# Patient Record
Sex: MALE | Race: WHITE | NOT HISPANIC OR LATINO
[De-identification: names, ages, dates, MRNs, and addresses within clinical notes are randomized per-mention and may not be internally consistent; named-entity substitution may affect disease eponyms.]

---

## 2017-05-09 ENCOUNTER — HOSPITAL (OUTPATIENT)
Dept: OTHER | Age: 42
End: 2017-05-09

## 2017-11-07 ENCOUNTER — HOSPITAL (OUTPATIENT)
Dept: OTHER | Age: 42
End: 2017-11-07
Attending: SURGERY

## 2017-11-07 LAB — SURG SPECIMEN: NORMAL

## 2018-02-15 ENCOUNTER — HOSPITAL (OUTPATIENT)
Dept: OTHER | Age: 43
End: 2018-02-15
Attending: EMERGENCY MEDICINE

## 2018-02-15 LAB
A/G RATIO_: 0.9
ABS LYMPH: 1.7 K/CUMM (ref 1–3.5)
ABS MONO: 0.5 K/CUMM (ref 0.1–0.8)
ABS NEUTRO: 3.8 K/CUMM (ref 2–8)
ALBUMIN: 3.7 G/DL (ref 3.5–5)
ALK PHOS: 104 UNIT/L (ref 50–124)
ALT/GPT: 14 UNIT/L (ref 0–55)
ANION GAP SERPL CALC-SCNC: 14 MEQ/L (ref 10–20)
AST/GOT: 21 UNIT/L (ref 5–34)
BASOPHIL: 1 % (ref 0–1)
BILI TOTAL: 0.3 MG/DL (ref 0.2–1)
BUN SERPL-MCNC: 14 MG/DL (ref 6–20)
CALCIUM: 9.3 MG/DL (ref 8.4–10.2)
CHLORIDE: 106 MEQ/L (ref 97–107)
CREATININE: 0.84 MG/DL (ref 0.6–1.3)
DIFF_TYPE?: NORMAL
EOSINOPHIL: 4 % (ref 0–6)
GLOBULIN_: 3.9 G/DL (ref 2–4.1)
GLUCOSE LVL: 92 MG/DL (ref 70–99)
HCT VFR BLD CALC: 45 % (ref 36–51)
HEMOLYSIS 2+: NORMAL
HEMOLYSIS 4+: NORMAL
HGB BLD-MCNC: 15 G/DL (ref 12–17)
ICTERIC 4+: NEGATIVE
IMMATURE GRAN: 0.6 % (ref 0–0.3)
INSTR WBC: 6.4 K/CUMM (ref 4–11)
LIPEMIC 3+: NEGATIVE
LYMPHOCYTE: 27 %
MCH RBC QN AUTO: 32 PG (ref 25–35)
MCHC RBC AUTO-ENTMCNC: 33 G/DL (ref 32–37)
MCV RBC AUTO: 95 FL (ref 78–97)
MONOCYTE: 8 %
NEUTROPHIL: 60 %
NRBC BLD MANUAL-RTO: 0 % (ref 0–0.2)
PLATELET: 228 K/CUMM (ref 150–450)
POTASSIUM: 4.5 MEQ/L (ref 3.5–5.1)
RBC # BLD: 4.71 M/CUMM (ref 4.2–6)
RDW: 12.9 % (ref 11.5–14.5)
SODIUM: 140 MEQ/L (ref 136–145)
TCO2: 25 MEQ/L (ref 19–29)
TOTAL PROTEIN: 7.6 G/DL (ref 6.4–8.3)
TROPONIN I: 0.01 NG/ML
WBC # BLD: 6.4 K/CUMM (ref 4–11)

## 2020-06-09 ENCOUNTER — HOSPITAL (OUTPATIENT)
Dept: OTHER | Age: 45
End: 2020-06-09
Attending: INTERNAL MEDICINE

## 2020-06-23 ENCOUNTER — HOSPITAL (OUTPATIENT)
Dept: OTHER | Age: 45
End: 2020-06-23
Attending: INTERNAL MEDICINE

## 2020-06-23 PROCEDURE — 93306 TTE W/DOPPLER COMPLETE: CPT | Performed by: INTERNAL MEDICINE

## 2020-06-24 LAB — REPORT TEXT: NORMAL

## 2021-06-09 ENCOUNTER — APPOINTMENT (OUTPATIENT)
Dept: CT IMAGING | Age: 46
End: 2021-06-09
Payer: COMMERCIAL

## 2021-06-09 ENCOUNTER — HOSPITAL ENCOUNTER (EMERGENCY)
Age: 46
Discharge: HOME | End: 2021-06-09
Attending: EMERGENCY MEDICINE
Payer: COMMERCIAL

## 2021-06-09 ENCOUNTER — ANESTHESIA (OUTPATIENT)
Dept: OPERATING ROOM | Age: 46
End: 2021-06-09
Payer: COMMERCIAL

## 2021-06-09 ENCOUNTER — APPOINTMENT (OUTPATIENT)
Dept: GENERAL RADIOLOGY | Age: 46
End: 2021-06-09
Payer: COMMERCIAL

## 2021-06-09 ENCOUNTER — ANESTHESIA EVENT (OUTPATIENT)
Dept: OPERATING ROOM | Age: 46
End: 2021-06-09
Payer: COMMERCIAL

## 2021-06-09 VITALS
HEART RATE: 82 BPM | DIASTOLIC BLOOD PRESSURE: 98 MMHG | SYSTOLIC BLOOD PRESSURE: 148 MMHG | WEIGHT: 220 LBS | BODY MASS INDEX: 31.5 KG/M2 | HEIGHT: 70 IN | TEMPERATURE: 97.6 F | RESPIRATION RATE: 20 BRPM | OXYGEN SATURATION: 95 %

## 2021-06-09 VITALS — TEMPERATURE: 96.4 F | DIASTOLIC BLOOD PRESSURE: 75 MMHG | SYSTOLIC BLOOD PRESSURE: 115 MMHG | OXYGEN SATURATION: 98 %

## 2021-06-09 DIAGNOSIS — R10.9 FLANK PAIN: ICD-10-CM

## 2021-06-09 DIAGNOSIS — N20.2 CALCULUS OF KIDNEY AND URETER: ICD-10-CM

## 2021-06-09 DIAGNOSIS — N20.0 KIDNEY STONE: Primary | ICD-10-CM

## 2021-06-09 PROBLEM — N20.1 URETERAL STONE: Status: ACTIVE | Noted: 2021-06-09

## 2021-06-09 LAB
-: ABNORMAL
ABSOLUTE EOS #: 0.1 K/UL (ref 0–0.4)
ABSOLUTE IMMATURE GRANULOCYTE: ABNORMAL K/UL (ref 0–0.3)
ABSOLUTE LYMPH #: 1.5 K/UL (ref 1–4.8)
ABSOLUTE MONO #: 0.8 K/UL (ref 0.1–1.2)
ALBUMIN SERPL-MCNC: 4.2 G/DL (ref 3.5–5.2)
ALBUMIN/GLOBULIN RATIO: 1.4 (ref 1–2.5)
ALP BLD-CCNC: 87 U/L (ref 40–129)
ALT SERPL-CCNC: 17 U/L (ref 5–41)
AMORPHOUS: ABNORMAL
ANION GAP SERPL CALCULATED.3IONS-SCNC: 11 MMOL/L (ref 9–17)
AST SERPL-CCNC: 21 U/L
BACTERIA: ABNORMAL
BASOPHILS # BLD: 1 % (ref 0–2)
BASOPHILS ABSOLUTE: 0.1 K/UL (ref 0–0.2)
BILIRUB SERPL-MCNC: 0.76 MG/DL (ref 0.3–1.2)
BILIRUBIN DIRECT: 0.18 MG/DL
BILIRUBIN URINE: ABNORMAL
BILIRUBIN, INDIRECT: 0.58 MG/DL (ref 0–1)
BUN BLDV-MCNC: 13 MG/DL (ref 6–20)
BUN/CREAT BLD: ABNORMAL (ref 9–20)
CALCIUM SERPL-MCNC: 9.2 MG/DL (ref 8.6–10.4)
CASTS UA: ABNORMAL /LPF
CHLORIDE BLD-SCNC: 104 MMOL/L (ref 98–107)
CO2: 23 MMOL/L (ref 20–31)
COLOR: ABNORMAL
COMMENT UA: ABNORMAL
CREAT SERPL-MCNC: 0.8 MG/DL (ref 0.7–1.2)
CRYSTALS, UA: ABNORMAL /HPF
DIFFERENTIAL TYPE: ABNORMAL
EOSINOPHILS RELATIVE PERCENT: 1 % (ref 1–4)
EPITHELIAL CELLS UA: ABNORMAL /HPF (ref 0–5)
GFR AFRICAN AMERICAN: >60 ML/MIN
GFR NON-AFRICAN AMERICAN: >60 ML/MIN
GFR SERPL CREATININE-BSD FRML MDRD: ABNORMAL ML/MIN/{1.73_M2}
GFR SERPL CREATININE-BSD FRML MDRD: ABNORMAL ML/MIN/{1.73_M2}
GLOBULIN: NORMAL G/DL (ref 1.5–3.8)
GLUCOSE BLD-MCNC: 141 MG/DL (ref 70–99)
GLUCOSE URINE: NEGATIVE
HCT VFR BLD CALC: 43.4 % (ref 41–53)
HEMOGLOBIN: 14.6 G/DL (ref 13.5–17.5)
IMMATURE GRANULOCYTES: ABNORMAL %
KETONES, URINE: ABNORMAL
LACTIC ACID: 1.9 MMOL/L (ref 0.5–2.2)
LEUKOCYTE ESTERASE, URINE: ABNORMAL
LIPASE: 42 U/L (ref 13–60)
LYMPHOCYTES # BLD: 13 % (ref 24–44)
MCH RBC QN AUTO: 32 PG (ref 26–34)
MCHC RBC AUTO-ENTMCNC: 33.6 G/DL (ref 31–37)
MCV RBC AUTO: 95.3 FL (ref 80–100)
MONOCYTES # BLD: 7 % (ref 2–11)
MUCUS: ABNORMAL
NITRITE, URINE: NEGATIVE
NRBC AUTOMATED: ABNORMAL PER 100 WBC
OTHER OBSERVATIONS UA: ABNORMAL
PDW BLD-RTO: 13 % (ref 12.5–15.4)
PH UA: 5.5 (ref 5–8)
PLATELET # BLD: 201 K/UL (ref 140–450)
PLATELET ESTIMATE: ABNORMAL
PMV BLD AUTO: 9.1 FL (ref 6–12)
POTASSIUM SERPL-SCNC: 3.7 MMOL/L (ref 3.7–5.3)
PROTEIN UA: ABNORMAL
RBC # BLD: 4.55 M/UL (ref 4.5–5.9)
RBC # BLD: ABNORMAL 10*6/UL
RBC UA: ABNORMAL /HPF (ref 0–2)
RENAL EPITHELIAL, UA: ABNORMAL /HPF
SEG NEUTROPHILS: 78 % (ref 36–66)
SEGMENTED NEUTROPHILS ABSOLUTE COUNT: 8.8 K/UL (ref 1.8–7.7)
SODIUM BLD-SCNC: 138 MMOL/L (ref 135–144)
SPECIFIC GRAVITY UA: 1.03 (ref 1–1.03)
TOTAL PROTEIN: 7.2 G/DL (ref 6.4–8.3)
TRICHOMONAS: ABNORMAL
TURBIDITY: ABNORMAL
URINE HGB: ABNORMAL
UROBILINOGEN, URINE: NORMAL
WBC # BLD: 11.3 K/UL (ref 3.5–11)
WBC # BLD: ABNORMAL 10*3/UL
WBC UA: ABNORMAL /HPF (ref 0–5)
YEAST: ABNORMAL

## 2021-06-09 PROCEDURE — 7100000000 HC PACU RECOVERY - FIRST 15 MIN: Performed by: UROLOGY

## 2021-06-09 PROCEDURE — 74177 CT ABD & PELVIS W/CONTRAST: CPT

## 2021-06-09 PROCEDURE — 2580000003 HC RX 258: Performed by: ANESTHESIOLOGY

## 2021-06-09 PROCEDURE — 99285 EMERGENCY DEPT VISIT HI MDM: CPT

## 2021-06-09 PROCEDURE — 83605 ASSAY OF LACTIC ACID: CPT

## 2021-06-09 PROCEDURE — 83690 ASSAY OF LIPASE: CPT

## 2021-06-09 PROCEDURE — 2580000003 HC RX 258: Performed by: EMERGENCY MEDICINE

## 2021-06-09 PROCEDURE — 3700000001 HC ADD 15 MINUTES (ANESTHESIA): Performed by: UROLOGY

## 2021-06-09 PROCEDURE — 6360000002 HC RX W HCPCS: Performed by: ANESTHESIOLOGY

## 2021-06-09 PROCEDURE — 2709999900 HC NON-CHARGEABLE SUPPLY: Performed by: UROLOGY

## 2021-06-09 PROCEDURE — 80048 BASIC METABOLIC PNL TOTAL CA: CPT

## 2021-06-09 PROCEDURE — 7100000010 HC PHASE II RECOVERY - FIRST 15 MIN: Performed by: UROLOGY

## 2021-06-09 PROCEDURE — 7100000011 HC PHASE II RECOVERY - ADDTL 15 MIN: Performed by: UROLOGY

## 2021-06-09 PROCEDURE — C1758 CATHETER, URETERAL: HCPCS | Performed by: UROLOGY

## 2021-06-09 PROCEDURE — 3600000012 HC SURGERY LEVEL 2 ADDTL 15MIN: Performed by: UROLOGY

## 2021-06-09 PROCEDURE — 96365 THER/PROPH/DIAG IV INF INIT: CPT

## 2021-06-09 PROCEDURE — 36415 COLL VENOUS BLD VENIPUNCTURE: CPT

## 2021-06-09 PROCEDURE — 2500000003 HC RX 250 WO HCPCS: Performed by: ANESTHESIOLOGY

## 2021-06-09 PROCEDURE — 6370000000 HC RX 637 (ALT 250 FOR IP)

## 2021-06-09 PROCEDURE — C2617 STENT, NON-COR, TEM W/O DEL: HCPCS | Performed by: UROLOGY

## 2021-06-09 PROCEDURE — 3600000002 HC SURGERY LEVEL 2 BASE: Performed by: UROLOGY

## 2021-06-09 PROCEDURE — 96375 TX/PRO/DX INJ NEW DRUG ADDON: CPT

## 2021-06-09 PROCEDURE — 96376 TX/PRO/DX INJ SAME DRUG ADON: CPT

## 2021-06-09 PROCEDURE — 81001 URINALYSIS AUTO W/SCOPE: CPT

## 2021-06-09 PROCEDURE — C2628 CATHETER, OCCLUSION: HCPCS | Performed by: UROLOGY

## 2021-06-09 PROCEDURE — 2500000003 HC RX 250 WO HCPCS

## 2021-06-09 PROCEDURE — C1769 GUIDE WIRE: HCPCS | Performed by: UROLOGY

## 2021-06-09 PROCEDURE — 6360000004 HC RX CONTRAST MEDICATION: Performed by: EMERGENCY MEDICINE

## 2021-06-09 PROCEDURE — 3700000000 HC ANESTHESIA ATTENDED CARE: Performed by: UROLOGY

## 2021-06-09 PROCEDURE — 85025 COMPLETE CBC W/AUTO DIFF WBC: CPT

## 2021-06-09 PROCEDURE — 3209999900 FLUORO FOR SURGICAL PROCEDURES

## 2021-06-09 PROCEDURE — 80076 HEPATIC FUNCTION PANEL: CPT

## 2021-06-09 PROCEDURE — 73562 X-RAY EXAM OF KNEE 3: CPT

## 2021-06-09 PROCEDURE — 6360000002 HC RX W HCPCS: Performed by: EMERGENCY MEDICINE

## 2021-06-09 DEVICE — URETERAL STENT
Type: IMPLANTABLE DEVICE | Status: FUNCTIONAL
Brand: POLARIS™ ULTRA

## 2021-06-09 RX ORDER — KETOROLAC TROMETHAMINE 30 MG/ML
INJECTION, SOLUTION INTRAMUSCULAR; INTRAVENOUS PRN
Status: DISCONTINUED | OUTPATIENT
Start: 2021-06-09 | End: 2021-06-09 | Stop reason: SDUPTHER

## 2021-06-09 RX ORDER — MIDAZOLAM HYDROCHLORIDE 1 MG/ML
INJECTION INTRAMUSCULAR; INTRAVENOUS PRN
Status: DISCONTINUED | OUTPATIENT
Start: 2021-06-09 | End: 2021-06-09 | Stop reason: SDUPTHER

## 2021-06-09 RX ORDER — DOCUSATE SODIUM 100 MG/1
100 CAPSULE, LIQUID FILLED ORAL 2 TIMES DAILY
COMMUNITY

## 2021-06-09 RX ORDER — 0.9 % SODIUM CHLORIDE 0.9 %
500 INTRAVENOUS SOLUTION INTRAVENOUS
Status: DISCONTINUED | OUTPATIENT
Start: 2021-06-09 | End: 2021-06-09 | Stop reason: HOSPADM

## 2021-06-09 RX ORDER — SODIUM CHLORIDE 9 MG/ML
25 INJECTION, SOLUTION INTRAVENOUS PRN
Status: DISCONTINUED | OUTPATIENT
Start: 2021-06-09 | End: 2021-06-09 | Stop reason: HOSPADM

## 2021-06-09 RX ORDER — DIPHENHYDRAMINE HYDROCHLORIDE 50 MG/ML
12.5 INJECTION INTRAMUSCULAR; INTRAVENOUS
Status: DISCONTINUED | OUTPATIENT
Start: 2021-06-09 | End: 2021-06-09 | Stop reason: HOSPADM

## 2021-06-09 RX ORDER — DOXYCYCLINE HYCLATE 50 MG/1
324 CAPSULE, GELATIN COATED ORAL
COMMUNITY

## 2021-06-09 RX ORDER — SODIUM CHLORIDE 0.9 % (FLUSH) 0.9 %
10 SYRINGE (ML) INJECTION PRN
Status: DISCONTINUED | OUTPATIENT
Start: 2021-06-09 | End: 2021-06-09 | Stop reason: HOSPADM

## 2021-06-09 RX ORDER — OXYCODONE HYDROCHLORIDE AND ACETAMINOPHEN 5; 325 MG/1; MG/1
1 TABLET ORAL EVERY 4 HOURS PRN
Qty: 30 TABLET | Refills: 0 | Status: SHIPPED | OUTPATIENT
Start: 2021-06-09 | End: 2021-06-09 | Stop reason: SDUPTHER

## 2021-06-09 RX ORDER — PHENAZOPYRIDINE HYDROCHLORIDE 200 MG/1
200 TABLET, FILM COATED ORAL 3 TIMES DAILY PRN
Qty: 9 TABLET | Refills: 0 | Status: SHIPPED | OUTPATIENT
Start: 2021-06-09

## 2021-06-09 RX ORDER — 0.9 % SODIUM CHLORIDE 0.9 %
1000 INTRAVENOUS SOLUTION INTRAVENOUS ONCE
Status: COMPLETED | OUTPATIENT
Start: 2021-06-09 | End: 2021-06-09

## 2021-06-09 RX ORDER — SODIUM CHLORIDE, SODIUM LACTATE, POTASSIUM CHLORIDE, CALCIUM CHLORIDE 600; 310; 30; 20 MG/100ML; MG/100ML; MG/100ML; MG/100ML
INJECTION, SOLUTION INTRAVENOUS CONTINUOUS PRN
Status: DISCONTINUED | OUTPATIENT
Start: 2021-06-09 | End: 2021-06-09 | Stop reason: SDUPTHER

## 2021-06-09 RX ORDER — LEVOFLOXACIN 5 MG/ML
500 INJECTION, SOLUTION INTRAVENOUS ONCE
Status: COMPLETED | OUTPATIENT
Start: 2021-06-09 | End: 2021-06-09

## 2021-06-09 RX ORDER — LIDOCAINE HYDROCHLORIDE 10 MG/ML
1 INJECTION, SOLUTION EPIDURAL; INFILTRATION; INTRACAUDAL; PERINEURAL
Status: DISCONTINUED | OUTPATIENT
Start: 2021-06-09 | End: 2021-06-09 | Stop reason: HOSPADM

## 2021-06-09 RX ORDER — CELECOXIB 200 MG/1
200 CAPSULE ORAL 2 TIMES DAILY
COMMUNITY

## 2021-06-09 RX ORDER — LABETALOL HYDROCHLORIDE 5 MG/ML
INJECTION, SOLUTION INTRAVENOUS
Status: COMPLETED
Start: 2021-06-09 | End: 2021-06-09

## 2021-06-09 RX ORDER — MORPHINE SULFATE 4 MG/ML
4 INJECTION, SOLUTION INTRAMUSCULAR; INTRAVENOUS ONCE
Status: COMPLETED | OUTPATIENT
Start: 2021-06-09 | End: 2021-06-09

## 2021-06-09 RX ORDER — FENTANYL CITRATE 50 UG/ML
INJECTION, SOLUTION INTRAMUSCULAR; INTRAVENOUS PRN
Status: DISCONTINUED | OUTPATIENT
Start: 2021-06-09 | End: 2021-06-09 | Stop reason: SDUPTHER

## 2021-06-09 RX ORDER — DEXAMETHASONE SODIUM PHOSPHATE 10 MG/ML
INJECTION, SOLUTION INTRAMUSCULAR; INTRAVENOUS PRN
Status: DISCONTINUED | OUTPATIENT
Start: 2021-06-09 | End: 2021-06-09 | Stop reason: SDUPTHER

## 2021-06-09 RX ORDER — SODIUM CHLORIDE 0.9 % (FLUSH) 0.9 %
10 SYRINGE (ML) INJECTION EVERY 12 HOURS SCHEDULED
Status: DISCONTINUED | OUTPATIENT
Start: 2021-06-09 | End: 2021-06-09 | Stop reason: HOSPADM

## 2021-06-09 RX ORDER — MORPHINE SULFATE 2 MG/ML
2 INJECTION, SOLUTION INTRAMUSCULAR; INTRAVENOUS EVERY 5 MIN PRN
Status: DISCONTINUED | OUTPATIENT
Start: 2021-06-09 | End: 2021-06-09 | Stop reason: HOSPADM

## 2021-06-09 RX ORDER — LABETALOL 20 MG/4 ML (5 MG/ML) INTRAVENOUS SYRINGE
10 EVERY 10 MIN PRN
Status: DISCONTINUED | OUTPATIENT
Start: 2021-06-09 | End: 2021-06-09 | Stop reason: HOSPADM

## 2021-06-09 RX ORDER — LIDOCAINE HYDROCHLORIDE 10 MG/ML
INJECTION, SOLUTION EPIDURAL; INFILTRATION; INTRACAUDAL; PERINEURAL PRN
Status: DISCONTINUED | OUTPATIENT
Start: 2021-06-09 | End: 2021-06-09 | Stop reason: SDUPTHER

## 2021-06-09 RX ORDER — OXYCODONE HYDROCHLORIDE AND ACETAMINOPHEN 5; 325 MG/1; MG/1
TABLET ORAL
Status: COMPLETED
Start: 2021-06-09 | End: 2021-06-09

## 2021-06-09 RX ORDER — ONDANSETRON 2 MG/ML
4 INJECTION INTRAMUSCULAR; INTRAVENOUS
Status: DISCONTINUED | OUTPATIENT
Start: 2021-06-09 | End: 2021-06-09 | Stop reason: HOSPADM

## 2021-06-09 RX ORDER — PROPOFOL 10 MG/ML
INJECTION, EMULSION INTRAVENOUS PRN
Status: DISCONTINUED | OUTPATIENT
Start: 2021-06-09 | End: 2021-06-09 | Stop reason: SDUPTHER

## 2021-06-09 RX ORDER — OXYCODONE HYDROCHLORIDE AND ACETAMINOPHEN 5; 325 MG/1; MG/1
1 TABLET ORAL EVERY 4 HOURS PRN
Qty: 30 TABLET | Refills: 0 | Status: SHIPPED | OUTPATIENT
Start: 2021-06-09 | End: 2021-06-12

## 2021-06-09 RX ORDER — OXYCODONE HYDROCHLORIDE AND ACETAMINOPHEN 5; 325 MG/1; MG/1
2 TABLET ORAL PRN
Status: COMPLETED | OUTPATIENT
Start: 2021-06-09 | End: 2021-06-09

## 2021-06-09 RX ORDER — ONDANSETRON 2 MG/ML
INJECTION INTRAMUSCULAR; INTRAVENOUS PRN
Status: DISCONTINUED | OUTPATIENT
Start: 2021-06-09 | End: 2021-06-09 | Stop reason: SDUPTHER

## 2021-06-09 RX ORDER — MEPERIDINE HYDROCHLORIDE 50 MG/ML
12.5 INJECTION INTRAMUSCULAR; INTRAVENOUS; SUBCUTANEOUS EVERY 5 MIN PRN
Status: DISCONTINUED | OUTPATIENT
Start: 2021-06-09 | End: 2021-06-09 | Stop reason: HOSPADM

## 2021-06-09 RX ORDER — ESCITALOPRAM OXALATE 20 MG/1
20 TABLET ORAL DAILY
COMMUNITY

## 2021-06-09 RX ORDER — 0.9 % SODIUM CHLORIDE 0.9 %
80 INTRAVENOUS SOLUTION INTRAVENOUS ONCE
Status: COMPLETED | OUTPATIENT
Start: 2021-06-09 | End: 2021-06-09

## 2021-06-09 RX ORDER — OXYBUTYNIN CHLORIDE 10 MG/1
10 TABLET, EXTENDED RELEASE ORAL DAILY
Qty: 5 TABLET | Refills: 0 | Status: SHIPPED | OUTPATIENT
Start: 2021-06-09 | End: 2021-06-09 | Stop reason: SDUPTHER

## 2021-06-09 RX ORDER — PHENAZOPYRIDINE HYDROCHLORIDE 200 MG/1
200 TABLET, FILM COATED ORAL 3 TIMES DAILY PRN
Qty: 9 TABLET | Refills: 0 | Status: SHIPPED | OUTPATIENT
Start: 2021-06-09 | End: 2021-06-09 | Stop reason: SDUPTHER

## 2021-06-09 RX ORDER — OXYCODONE HYDROCHLORIDE AND ACETAMINOPHEN 5; 325 MG/1; MG/1
1 TABLET ORAL PRN
Status: COMPLETED | OUTPATIENT
Start: 2021-06-09 | End: 2021-06-09

## 2021-06-09 RX ORDER — SODIUM CHLORIDE, SODIUM LACTATE, POTASSIUM CHLORIDE, CALCIUM CHLORIDE 600; 310; 30; 20 MG/100ML; MG/100ML; MG/100ML; MG/100ML
INJECTION, SOLUTION INTRAVENOUS CONTINUOUS
Status: DISCONTINUED | OUTPATIENT
Start: 2021-06-09 | End: 2021-06-09 | Stop reason: HOSPADM

## 2021-06-09 RX ORDER — OXYBUTYNIN CHLORIDE 10 MG/1
10 TABLET, EXTENDED RELEASE ORAL DAILY
Qty: 5 TABLET | Refills: 0 | Status: SHIPPED | OUTPATIENT
Start: 2021-06-09 | End: 2021-06-14

## 2021-06-09 RX ORDER — PROMETHAZINE HYDROCHLORIDE 25 MG/ML
12.5 INJECTION, SOLUTION INTRAMUSCULAR; INTRAVENOUS
Status: DISCONTINUED | OUTPATIENT
Start: 2021-06-09 | End: 2021-06-09 | Stop reason: HOSPADM

## 2021-06-09 RX ADMIN — DEXAMETHASONE SODIUM PHOSPHATE 8 MG: 10 INJECTION, SOLUTION INTRAMUSCULAR; INTRAVENOUS at 14:52

## 2021-06-09 RX ADMIN — PROPOFOL 200 MG: 10 INJECTION, EMULSION INTRAVENOUS at 14:52

## 2021-06-09 RX ADMIN — PROPOFOL 100 MG: 10 INJECTION, EMULSION INTRAVENOUS at 14:54

## 2021-06-09 RX ADMIN — LEVOFLOXACIN 500 MG: 5 INJECTION, SOLUTION INTRAVENOUS at 08:43

## 2021-06-09 RX ADMIN — LABETALOL HYDROCHLORIDE 10 MG: 5 INJECTION, SOLUTION INTRAVENOUS at 15:52

## 2021-06-09 RX ADMIN — SODIUM CHLORIDE 80 ML: 9 INJECTION, SOLUTION INTRAVENOUS at 07:46

## 2021-06-09 RX ADMIN — SODIUM CHLORIDE, POTASSIUM CHLORIDE, SODIUM LACTATE AND CALCIUM CHLORIDE: 600; 310; 30; 20 INJECTION, SOLUTION INTRAVENOUS at 13:30

## 2021-06-09 RX ADMIN — MORPHINE SULFATE 4 MG: 4 INJECTION INTRAVENOUS at 07:33

## 2021-06-09 RX ADMIN — OXYCODONE HYDROCHLORIDE AND ACETAMINOPHEN 1 TABLET: 5; 325 TABLET ORAL at 15:43

## 2021-06-09 RX ADMIN — LIDOCAINE HYDROCHLORIDE 50 MG: 10 INJECTION, SOLUTION EPIDURAL; INFILTRATION; INTRACAUDAL; PERINEURAL at 14:52

## 2021-06-09 RX ADMIN — ONDANSETRON 4 MG: 2 INJECTION INTRAMUSCULAR; INTRAVENOUS at 15:19

## 2021-06-09 RX ADMIN — IOPAMIDOL 75 ML: 755 INJECTION, SOLUTION INTRAVENOUS at 07:46

## 2021-06-09 RX ADMIN — SODIUM CHLORIDE 1000 ML: 9 INJECTION, SOLUTION INTRAVENOUS at 07:37

## 2021-06-09 RX ADMIN — FENTANYL CITRATE 100 MCG: 50 INJECTION, SOLUTION INTRAMUSCULAR; INTRAVENOUS at 14:47

## 2021-06-09 RX ADMIN — MIDAZOLAM HYDROCHLORIDE 2 MG: 1 INJECTION, SOLUTION INTRAMUSCULAR; INTRAVENOUS at 14:47

## 2021-06-09 RX ADMIN — SODIUM CHLORIDE, PRESERVATIVE FREE 10 ML: 5 INJECTION INTRAVENOUS at 07:47

## 2021-06-09 RX ADMIN — MORPHINE SULFATE 4 MG: 4 INJECTION INTRAVENOUS at 12:12

## 2021-06-09 RX ADMIN — SODIUM CHLORIDE, POTASSIUM CHLORIDE, SODIUM LACTATE AND CALCIUM CHLORIDE: 600; 310; 30; 20 INJECTION, SOLUTION INTRAVENOUS at 14:47

## 2021-06-09 RX ADMIN — LABETALOL 20 MG/4 ML (5 MG/ML) INTRAVENOUS SYRINGE 10 MG: at 15:52

## 2021-06-09 RX ADMIN — KETOROLAC TROMETHAMINE 30 MG: 30 INJECTION, SOLUTION INTRAMUSCULAR at 15:19

## 2021-06-09 RX ADMIN — LABETALOL 20 MG/4 ML (5 MG/ML) INTRAVENOUS SYRINGE 10 MG: at 16:11

## 2021-06-09 ASSESSMENT — PULMONARY FUNCTION TESTS
PIF_VALUE: 4
PIF_VALUE: 16
PIF_VALUE: 7
PIF_VALUE: 5
PIF_VALUE: 17
PIF_VALUE: 17
PIF_VALUE: 1
PIF_VALUE: 19
PIF_VALUE: 4
PIF_VALUE: 1
PIF_VALUE: 5
PIF_VALUE: 21
PIF_VALUE: 5
PIF_VALUE: 4
PIF_VALUE: 18
PIF_VALUE: 4
PIF_VALUE: 4
PIF_VALUE: 18
PIF_VALUE: 19
PIF_VALUE: 22
PIF_VALUE: 18
PIF_VALUE: 1
PIF_VALUE: 4
PIF_VALUE: 1
PIF_VALUE: 4
PIF_VALUE: 13
PIF_VALUE: 4
PIF_VALUE: 15
PIF_VALUE: 2
PIF_VALUE: 13
PIF_VALUE: 4
PIF_VALUE: 5
PIF_VALUE: 13

## 2021-06-09 ASSESSMENT — PAIN SCALES - GENERAL
PAINLEVEL_OUTOF10: 0
PAINLEVEL_OUTOF10: 2
PAINLEVEL_OUTOF10: 2
PAINLEVEL_OUTOF10: 0
PAINLEVEL_OUTOF10: 2
PAINLEVEL_OUTOF10: 2
PAINLEVEL_OUTOF10: 8
PAINLEVEL_OUTOF10: 10
PAINLEVEL_OUTOF10: 2
PAINLEVEL_OUTOF10: 0
PAINLEVEL_OUTOF10: 10

## 2021-06-09 ASSESSMENT — PAIN - FUNCTIONAL ASSESSMENT: PAIN_FUNCTIONAL_ASSESSMENT: 0-10

## 2021-06-09 ASSESSMENT — ENCOUNTER SYMPTOMS: ABDOMINAL PAIN: 1

## 2021-06-09 ASSESSMENT — PAIN DESCRIPTION - PAIN TYPE: TYPE: ACUTE PAIN

## 2021-06-09 ASSESSMENT — PAIN DESCRIPTION - DESCRIPTORS: DESCRIPTORS: ACHING

## 2021-06-09 ASSESSMENT — PAIN DESCRIPTION - ONSET: ONSET: AWAKENED FROM SLEEP

## 2021-06-09 ASSESSMENT — PAIN DESCRIPTION - FREQUENCY: FREQUENCY: CONTINUOUS

## 2021-06-09 ASSESSMENT — PAIN DESCRIPTION - LOCATION: LOCATION: ABDOMEN

## 2021-06-09 NOTE — CONSULTS
Dr. Heather Dominique  Urology Consultation      Patient:  Devyn Acosta  MRN: [id number]  YOB: 1975    CHIEF COMPLAINT:  Left flank pain and ureteral stone    HISTORY OF PRESENT ILLNESS:   The patient is a 50 y.o. male who presents with left flank pain x several days. Severe and sharp in nature, relieve with narcotic pain medications. Presented to ER. Found to have left 7 mm stone in distal ureter detected on CT scan. I independently reviewed and verified the images and reports from:      CT ABDOMEN PELVIS W IV CONTRAST Additional Contrast? None    Result Date: 6/9/2021  EXAMINATION: CT OF THE ABDOMEN AND PELVIS WITH CONTRAST 6/9/2021 7:18 am TECHNIQUE: CT of the abdomen and pelvis was performed with the administration of intravenous contrast. Multiplanar reformatted images are provided for review. Dose modulation, iterative reconstruction, and/or weight based adjustment of the mA/kV was utilized to reduce the radiation dose to as low as reasonably achievable. COMPARISON: None. HISTORY: ORDERING SYSTEM PROVIDED HISTORY: abdominal pain, history of obstruction TECHNOLOGIST PROVIDED HISTORY: abdominal pain, history of obstruction Decision Support Exception - unselect if not a suspected or confirmed emergency medical condition->Emergency Medical Condition (MA) Reason for Exam: severe abd pain Acuity: Acute Type of Exam: Initial Relevant Medical/Surgical History: hx bowel obstruction FINDINGS: Lower Chest: No acute findings. Organs: Mildly obstructing stone in the distal left ureter measuring 0.5 x 0.7 cm. No significant perinephric stranding. Punctate stones in the lower pole the right kidney are noted. Bilateral renal cysts. The liver, gallbladder, pancreas, spleen and adrenals reveal no significant findings. GI/Bowel: There is no bowel dilatation or wall thickening identified. Small bowel anastomosis in the left pelvis is noted with fecalization at this site, likely due to stasis.  Pelvis: No acute findings. Peritoneum/Retroperitoneum: No free air or free fluid. The aorta is normal in caliber. The visceral branches are patent. No lymphadenopathy. Bones/Soft Tissues: No acute findings. 1.  Mildly obstructing 0.5 x 0.7 cm stone in the distal left ureter. 2.  Punctate right nephrolithiasis. 3.  Sequela of small bowel surgery. No evidence for bowel obstruction. Patient's old records, notes and chart reviewed and summarized above. Past Medical History:    Past Medical History:   Diagnosis Date    Bowel obstruction ([address]        Past Surgical History:    Past Surgical History:   Procedure Laterality Date    CATARACT REMOVAL WITH IMPLANT Bilateral     COLON SURGERY      TOENAIL EXCISION      VASECTOMY         Medications:      Current Facility-Administered Medications:     sodium chloride flush 0.9 % injection 10 mL, 10 mL, Intravenous, PRN, Uma Venegas MD, 10 mL at 06/09/21 0747    lactated ringers infusion, , Intravenous, Continuous, Rico Cardoza MD, Last Rate: 100 mL/hr at 06/09/21 1330, New Bag at 06/09/21 1330    sodium chloride flush 0.9 % injection 10 mL, 10 mL, Intravenous, 2 times per day, Rico Cardoza MD    sodium chloride flush 0.9 % injection 10 mL, 10 mL, Intravenous, PRN, Damian Calderon MD    0.9 % sodium chloride infusion, 25 mL, Intravenous, PRN, Rico Cardoza MD    lidocaine PF 1 % injection 1 mL, 1 mL, Intradermal, Once PRN, Rico Cardoza MD    Allergies:     Allergies   Allergen Reactions    Penicillin G Rash       Social History:   Social History     Socioeconomic History    Marital status:      Spouse name: Not on file    Number of children: Not on file    Years of education: Not on file    Highest education level: Not on file   Occupational History    Not on file   Tobacco Use    Smoking status: Never Smoker    Smokeless tobacco: Never Used   Vaping Use    Vaping Use: Never used   Substance and Sexual Activity    Patient in no acute distress. Neuro: alert and oriented to person place and time. Head: Atraumatic and normocephalic. Neck: Trachea midline   Ext: 2+ radial pulses bilaterally. Psych: Mood and affect normal.  Skin: No rashes or bruising present. Lungs: Respiratory effort normal.  Cardiovascular:  Regular rhythm. Abdomen: Soft, non-tender, non-distended. .  Bladder non-tender and not distended. Lymphatics: no palpable lymphadenopathy. Labs:  Recent Labs     06/09/21  0728   WBC 11.3*   HGB 14.6   HCT 43.4   MCV 95.3        Recent Labs     06/09/21  0728      K 3.7      CO2 23   BUN 13   CREATININE 0.80       Recent Labs     06/09/21  0725   COLORU YUNIEL*   PHUR 5.5   WBCUA 2 TO 5   RBCUA TOO NUMEROUS TO COUNT   MUCUS 2+*   TRICHOMONAS NOT REPORTED   YEAST NOT REPORTED   BACTERIA NOT REPORTED   SPECGRAV 1.034*   LEUKOCYTESUR TRACE*   UROBILINOGEN Normal   BILIRUBINUR SMALL*       Culture Results:  @[facility]@      -----------------------------------------------------------------  Imaging Results:  XR KNEE RIGHT (3 VIEWS)    Result Date: 6/9/2021  EXAMINATION: THREE XRAY VIEWS OF THE RIGHT KNEE 6/9/2021 12:14 pm COMPARISON: None. HISTORY: ORDERING SYSTEM PROVIDED HISTORY: pain TECHNOLOGIST PROVIDED HISTORY: pain Reason for Exam: rt knee pain Acuity: Acute Type of Exam: Initial Mechanism of Injury: fall 25-year-old female with acute right knee pain FINDINGS: 4 mm metallic density/retained foreign body is seen in the soft tissues along the medial aspect of the knee at the level of the distal femoral diaphysis. Joint spaces are well maintained. Small to moderate joint effusion. Osseous alignment is normal.  No acute fracture or dislocation. Mild to moderate edema in the soft tissues about the right knee. 1. 4 mm retained metallic foreign body in the soft tissues at the medial aspect of the knee. Mild to moderate edema in the soft tissues about the right knee.  2. Small to moderate joint effusion. 3. No acute fracture or dislocation. CT ABDOMEN PELVIS W IV CONTRAST Additional Contrast? None    Result Date: 6/9/2021  EXAMINATION: CT OF THE ABDOMEN AND PELVIS WITH CONTRAST 6/9/2021 7:18 am TECHNIQUE: CT of the abdomen and pelvis was performed with the administration of intravenous contrast. Multiplanar reformatted images are provided for review. Dose modulation, iterative reconstruction, and/or weight based adjustment of the mA/kV was utilized to reduce the radiation dose to as low as reasonably achievable. COMPARISON: None. HISTORY: ORDERING SYSTEM PROVIDED HISTORY: abdominal pain, history of obstruction TECHNOLOGIST PROVIDED HISTORY: abdominal pain, history of obstruction Decision Support Exception - unselect if not a suspected or confirmed emergency medical condition->Emergency Medical Condition (MA) Reason for Exam: severe abd pain Acuity: Acute Type of Exam: Initial Relevant Medical/Surgical History: hx bowel obstruction FINDINGS: Lower Chest: No acute findings. Organs: Mildly obstructing stone in the distal left ureter measuring 0.5 x 0.7 cm. No significant perinephric stranding. Punctate stones in the lower pole the right kidney are noted. Bilateral renal cysts. The liver, gallbladder, pancreas, spleen and adrenals reveal no significant findings. GI/Bowel: There is no bowel dilatation or wall thickening identified. Small bowel anastomosis in the left pelvis is noted with fecalization at this site, likely due to stasis. Pelvis: No acute findings. Peritoneum/Retroperitoneum: No free air or free fluid. The aorta is normal in caliber. The visceral branches are patent. No lymphadenopathy. Bones/Soft Tissues: No acute findings. 1.  Mildly obstructing 0.5 x 0.7 cm stone in the distal left ureter. 2.  Punctate right nephrolithiasis. 3.  Sequela of small bowel surgery. No evidence for bowel obstruction.        Assessment and Plan   Impression:    Patient Active Problem List Diagnosis    Flank pain    Ureteral stone    Kidney stone           Plan:      Stone management: Discussed all options of stone management- Medical expulsion therapy, surgery in form of ureteroscopy. Discussed risks, benefits, alternatives of each. Discussed complication and expectations. He would like to proceed with treatment    Will arrange for cystoscopy LEFT ureteroscopy, holmium laser lithotripsy and stent placement    If doing well post op, can go home. Follow up will be arranged. Thank you for involving us in the care of [facility]. Should you have any questions, please do not hesitate to contact us at any time.     Maryjane Mccauley M.D, MD  2:33 PM 6/9/2021

## 2021-06-09 NOTE — PROGRESS NOTES
Patient arrived to ED room 10 with complaints of severe abdominal pain starting at 5 am.  Patient stated that he has a history of a bowel obstruction. Patient denies any nausea and vomiting. Patients abdomen distended. Patient is instructed on plan of care.

## 2021-06-09 NOTE — PROGRESS NOTES
PreOp called inquiring about COVID vaccine. Patient has had the Vaccine and he has the card with him.

## 2021-06-09 NOTE — OP NOTE
Lara Hurley  1975  [id number]    DATE: 06/09/21  SURGEON: Dr. Andressa Oliveira M.D, MD MD  PREOPERATIVE DIAGNOSIS: Left distal ureteral stone  POSTOPERATIVE DIAGNOSIS: Same  PROCEDURES PERFORMED:  1. Cystoscopy. 2. Left sided ureteral dilation and ureteroscopy with holmium laser lithotripsy  3. Left sided stent placement. DRAINS: A Left sided 6 x 26 double J ureteral stent ( with string)  SPECIMEN: none  ANESTHESIA: General  ESTIMATED BLOOD LOSS: None.   COMPLICATIONS: None.     INDICATIONS FOR PROCEDURE:  Lara Hurley is a 50 y.o. male presents for Left sided ureteral 7 mm distal calculus. After the risks, benefits, alternatives, of the procedure were discussed with the patient, informed consent was obtained. The patient elected to proceed.     DETAILS OF THE PROCEDURE:  The patient was brought back from the preoperative holding area to the  operating suite, and was transferred to the operating table where the patient lay in  supine position. EPC's were in place, connected to the machine and the machine was turned on before induction. General endotracheal anesthesia was induced, and patient was prepped and draped in standard surgical fashion after being placed in dorsolithotomy position. A proper timeout was performed, preoperative antibiotics were given. We began by inserting a cystoscope with a 22 [place]ian sheath and 30 degree lens into the patient's urethral meatus and advancing into the bladder without complication. A pan cystoscopy was preformed and the bladder appeared unremarkable. We then focused our attention on the Left ureteral orifice, which we cannulated with our glidewire, advanced up to renal pelvis. We then used a  dual lumen catheter to place a second wire and dilate the distal ureter. Once in good position, we advanced our ureteroscope over the working wire to the renal pelvis under direct visualization.   We identified the renal calculus and using a 200 micron holmium laser fiber we fragmented the calculus. It was dusted and the fragments appeared to be under 1 millimeter and could pass easily. At this time a complete pyeloscopy was preformed and no other substantial fragments were identified. We withdrew the ureteroscope and visualized the entire ureter. No stone fragments were identified. No damage to the ureter was identified. At this time, over the remaining glidewire, we placed a 6x26 double J ureteral stent in the usual fashion, and we noted appropriate placement in the upper collecting system using fluoroscopy. There was a good curl noted in the bladder. We decided to leave a string at the end of the stent, which was attached with benzoin and steri-strips. The patient's bladder was drained and removed the scope and the procedure was subsequently terminated.         Plan:  Discharge home in good condition  The patient can pull the stent in 7 days

## 2021-06-09 NOTE — ANESTHESIA PRE PROCEDURE
Department of Anesthesiology  Preprocedure Note       Name:  Supriya Kimball   Age:  50 y.o.  :  1975                                          MRN:  [id number]         Date:  2021      Surgeon: Antonio Lee):  Juan Khoury MD    Procedure: Procedure(s):  CYSTOSCOPY LEFT URETEROSCOPY LASER HOLMIUM  CYSTOSCOPY LEFT URETERAL STENT INSERTION    Medications prior to admission:   Prior to Admission medications    Medication Sig Start Date End Date Taking? Authorizing Provider   escitalopram (LEXAPRO) 20 MG tablet Take 20 mg by mouth daily   Yes Historical Provider, MD   celecoxib (CELEBREX) 200 MG capsule Take 200 mg by mouth 2 times daily   Yes Historical Provider, MD   ferrous gluconate (FERGON) 324 (38 Fe) MG tablet Take 324 mg by mouth daily (with breakfast)   Yes Historical Provider, MD   docusate sodium (COLACE) 100 MG capsule Take 100 mg by mouth 2 times daily   Yes Historical Provider, MD   Multiple Vitamins-Minerals (MULTIVITAL PO) Take by mouth   Yes Historical Provider, MD       Current medications:    Current Facility-Administered Medications   Medication Dose Route Frequency Provider Last Rate Last Admin    sodium chloride flush 0.9 % injection 10 mL  10 mL Intravenous PRN Katy Durbin MD   10 mL at 21 0747    lactated ringers infusion   Intravenous Continuous Mariann Lo  mL/hr at 21 1330 New Bag at 21 1330    sodium chloride flush 0.9 % injection 10 mL  10 mL Intravenous 2 times per day Mariann Lo MD        sodium chloride flush 0.9 % injection 10 mL  10 mL Intravenous PRN Mariann Lo MD        0.9 % sodium chloride infusion  25 mL Intravenous PRN Mariann Lo MD        lidocaine PF 1 % injection 1 mL  1 mL Intradermal Once PRN Mariann Lo MD           Allergies:     Allergies   Allergen Reactions    Penicillin G Rash       Problem List:    Patient Active Problem List   Diagnosis Code    Flank pain R10.9    Ureteral stone N20.1    Kidney stone N20.0       Past Medical History:        Diagnosis Date    Bowel obstruction (H[facility])        Past Surgical History:        Procedure Laterality Date    CATARACT REMOVAL WITH IMPLANT Bilateral     COLON SURGERY      TOENAIL EXCISION      VASECTOMY         Social History:    Social History     Tobacco Use    Smoking status: Never Smoker    Smokeless tobacco: Never Used   Substance Use Topics    Alcohol use: Not Currently                                Counseling given: Not Answered      Vital Signs (Current):   Vitals:    06/09/21 0706 06/09/21 0844 06/09/21 1002 06/09/21 1315   BP: (!) 153/108 136/88 (!) 142/86 128/87   Pulse: 87 92 89 77   Resp: 18 18 18 16   Temp: 98 °F (36.7 °C)  98.4 °F (36.9 °C) 98.2 °F (36.8 °C)   TempSrc: Oral  Oral Temporal   SpO2: 99%  98% 96%   Weight: 220 lb (99.8 kg)   220 lb (99.8 kg)   Height: 5' 10\" (1.778 m)   5' 10\" (1.778 m)                                              BP Readings from Last 3 Encounters:   06/09/21 128/87       NPO Status: Time of last liquid consumption: 2000                        Time of last solid consumption: 1730                        Date of last liquid consumption: 06/08/21                        Date of last solid food consumption: 06/08/21    BMI:   Wt Readings from Last 3 Encounters:   06/09/21 220 lb (99.8 kg)     Body mass index is 31.57 kg/m².     CBC:   Lab Results   Component Value Date    WBC 11.3 06/09/2021    RBC 4.55 06/09/2021    HGB 14.6 06/09/2021    HCT 43.4 06/09/2021    MCV 95.3 06/09/2021    RDW 13.0 06/09/2021     06/09/2021       CMP:   Lab Results   Component Value Date     06/09/2021    K 3.7 06/09/2021     06/09/2021    CO2 23 06/09/2021    BUN 13 06/09/2021    CREATININE 0.80 06/09/2021    GFRAA >60 06/09/2021    LABGLOM >60 06/09/2021    GLUCOSE 141 06/09/2021    PROT 7.2 06/09/2021    CALCIUM 9.2 06/09/2021    BILITOT 0.76 06/09/2021    ALKPHOS 87 06/09/2021    AST 21 06/09/2021    ALT 17 06/09/2021       POC Tests: No results for input(s): POCGLU, POCNA, POCK, POCCL, POCBUN, POCHEMO, POCHCT in the last 72 hours. Coags: No results found for: PROTIME, INR, APTT    HCG (If Applicable): No results found for: PREGTESTUR, PREGSERUM, HCG, HCGQUANT     ABGs: No results found for: PHART, PO2ART, YPX6SYT, STO1NPM, BEART, [id number]     Type & Screen (If Applicable):  No results found for: LABABO, LABRH    Drug/Infectious Status (If Applicable):  No results found for: HIV, HEPCAB    COVID-19 Screening (If Applicable): No results found for: COVID19        Anesthesia Evaluation  Patient summary reviewed and Nursing notes reviewed  Airway: Mallampati: II  TM distance: >3 FB   Neck ROM: full  Mouth opening: > = 3 FB Dental:      Comment: -UPPER AND LOWER PERMANENT IMPLANTS  -MISSING SOME UPPER AND LOWER TEETH    Pulmonary:normal exam    (+) asthma:                           ROS comment: -ASTHMA WELL CONTROLLED - SELDOM RESCUE INHALER USE   Cardiovascular:Negative CV ROS                      Neuro/Psych:   Negative Neuro/Psych ROS              GI/Hepatic/Renal: Neg GI/Hepatic/Renal ROS            Endo/Other: Negative Endo/Other ROS                     ROS comment: -NPO AFTER MIDNIGHT  -ALLERGIES - PCN Abdominal:           Vascular: negative vascular ROS. Anesthesia Plan      general     ASA 2     (LMA)  Induction: intravenous. MIPS: Postoperative opioids intended and Prophylactic antiemetics administered. Anesthetic plan and risks discussed with patient. Plan discussed with CRNA.     Attending anesthesiologist reviewed and agrees with Chetna Augustin MD   6/9/2021

## 2021-06-09 NOTE — ED PROVIDER NOTES
[facility] [place]  [address] [facility]. [facility] [address]  Phone: [phone]        Pt Name: Mikhail Green  MRN: [id number]  Elizabethgfciera 1975  Date of evaluation: 6/9/21      CHIEF COMPLAINT     Chief Complaint   Patient presents with    Abdominal Pain         HISTORY OF PRESENT ILLNESS  (Location/Symptom, Timing/Onset, Context/Setting, Quality, Duration, Modifying Factors, Severity.)    Mikhail Green is a 50 y.o. male who presents with abdominal pain. This 66-year-old male states that he developed abdominal pain starting at approximately 5 AM it hurts the left lower quadrant the last time he had abdominal pain similar to this he had a bowel obstruction the patient states he has had his colon removed secondary to suffering from ulcerative colitis he denies any vomiting states he is not had a bowel movement today there is been no recent blood in the urine or stool no fever no chills no chest pain no shortness of breath nothing he does makes his abdominal pain better or worse      REVIEW OF SYSTEMS    (2-9 systems for level 4, 10 or more for level 5)     Review of Systems   Gastrointestinal: Positive for abdominal pain. All other systems reviewed and are negative. PAST MEDICAL HISTORY    has a past medical history of Bowel obstruction ([address] SURGICAL HISTORY      has a past surgical history that includes Colon surgery; Vasectomy; toenail excision; Cataract removal with implant (Bilateral); and Cystocopy (Left, 06/09/2021).     [address]       Current Discharge Medication List      CONTINUE these medications which have NOT CHANGED    Details   escitalopram (LEXAPRO) 20 MG tablet Take 20 mg by mouth daily      celecoxib (CELEBREX) 200 MG capsule Take 200 mg by mouth 2 times daily      ferrous gluconate (FERGON) 324 (38 Fe) MG tablet Take 324 mg by mouth daily (with breakfast)      docusate sodium (COLACE) 100 MG capsule Take 100 mg by mouth 2 times daily      Multiple UA and a CT of the abdomen and pelvis    DIAGNOSTIC RESULTS         RADIOLOGY:      Interpretation per the Radiologist below, if available at the time of this note:    CT ABDOMEN PELVIS W IV CONTRAST Additional Contrast? None (Final result)  Result time 06/09/21 08:09:59  Final result by Charley Aleman MD (06/09/21 08:09:59)                Impression:    1.  Mildly obstructing 0.5 x 0.7 cm stone in the distal left ureter. 2.  Punctate right nephrolithiasis. 3.  Sequela of small bowel surgery.  No evidence for bowel obstruction. Narrative:    EXAMINATION:   CT OF THE ABDOMEN AND PELVIS WITH CONTRAST 6/9/2021 7:18 am     TECHNIQUE:   CT of the abdomen and pelvis was performed with the administration of   intravenous contrast. Multiplanar reformatted images are provided for review. Dose modulation, iterative reconstruction, and/or weight based adjustment of   the mA/kV was utilized to reduce the radiation dose to as low as reasonably   achievable. COMPARISON:   None. HISTORY:   ORDERING SYSTEM PROVIDED HISTORY: abdominal pain, history of obstruction   TECHNOLOGIST PROVIDED HISTORY:     abdominal pain, history of obstruction   Decision Support Exception - unselect if not a suspected or confirmed   emergency medical condition->Emergency Medical Condition (MA)   Reason for Exam: severe abd pain   Acuity: Acute   Type of Exam: Initial   Relevant Medical/Surgical History: hx bowel obstruction     FINDINGS:   Lower Chest: No acute findings. Organs: Mildly obstructing stone in the distal left ureter measuring 0.5 x   0.7 cm.  No significant perinephric stranding.  Punctate stones in the lower   pole the right kidney are noted.  Bilateral renal cysts. The liver, gallbladder, pancreas, spleen and adrenals reveal no significant   findings. GI/Bowel:  There is no bowel dilatation or wall thickening identified.  Small   bowel anastomosis in the left pelvis is noted with fecalization at this 3.5 - 5.2 g/dL    Alkaline Phosphatase 87 40 - 129 U/L    ALT 17 5 - 41 U/L    AST 21 <40 U/L    Total Bilirubin 0.76 0.3 - 1.2 mg/dL    Bilirubin, Direct 0.18 <0.31 mg/dL    Bilirubin, Indirect 0.58 0.00 - 1.00 mg/dL    Total Protein 7.2 6.4 - 8.3 g/dL    Globulin NOT REPORTED 1.5 - 3.8 g/dL    Albumin/Globulin Ratio 1.4 1.0 - 2.5   Lipase   Result Value Ref Range    Lipase 42 13 - 60 U/L   Urinalysis Reflex to Culture   Result Value Ref Range    Color, UA YUNIEL (A) YELLOW    Turbidity UA CLOUDY (A) CLEAR    Glucose, Ur NEGATIVE NEGATIVE    Bilirubin Urine SMALL (A) NEGATIVE    Ketones, Urine TRACE (A) NEGATIVE    Specific Gravity, UA 1.034 (H) 1.005 - 1.030    Urine Hgb LARGE (A) NEGATIVE    pH, UA 5.5 5.0 - 8.0    Protein, UA 2+ (A) NEGATIVE    Urobilinogen, Urine Normal Normal    Nitrite, Urine NEGATIVE NEGATIVE    Leukocyte Esterase, Urine TRACE (A) NEGATIVE    Urinalysis Comments NOT REPORTED    Microscopic Urinalysis   Result Value Ref Range    -          WBC, UA 2 TO 5 0 - 5 /HPF    RBC, UA TOO NUMEROUS TO COUNT 0 - 2 /HPF    Casts UA NOT REPORTED /LPF    Crystals, UA FEW CALCIUM OXALATE (A) None /HPF    Epithelial Cells UA 2 TO 5 0 - 5 /HPF    Renal Epithelial, UA NOT REPORTED 0 /HPF    Bacteria, UA NOT REPORTED None    Mucus, UA 2+ (A) None    Trichomonas, UA NOT REPORTED None    Amorphous, UA NOT REPORTED None    Other Observations UA (A) NOT REQ. Utilizing a urinalysis as the only screening method to exclude a potential uropathogen can be unreliable in many patient populations. Rapid screening tests are less sensitive than culture and if UTI is a clinical possibility, culture should be considered despite a negative urinalysis.     Yeast, UA NOT REPORTED None           EMERGENCY DEPARTMENT COURSE:   Vitals:    Vitals:    06/09/21 1550 06/09/21 1600 06/09/21 1610 06/09/21 1615   BP: (!) 165/100   (!) 148/98   Pulse: 89   82   Resp: 15 15 20 20   Temp:       TempSrc:       SpO2: 91%   95%   Weight: Height:         -------------------------  BP: (!) 148/98, Temp: 97.6 °F (36.4 °C), Pulse: 82, Resp: 20      RE-EVALUATION:  08:10 The patient is resting comfortably, pain free after morphine reviewed the labs CT and UA report with the patient the patient states that [place] from [place] but will be here for the next several weeks so I will discuss the patient with urology    CONSULTS:  I did discuss the patient with urology and they are requesting that we keep the patient n.p.o. and they will take the patient to the OR at noon I relayed this information to the patient and he is agreeable to the admission    PROCEDURES:  None    FINAL IMPRESSION      1. Kidney stone    2. Calculus of kidney and ureter    3. Flank pain          DISPOSITION/PLAN   DISPOSITION        CONDITION ON DISPOSITION:   Stable    PATIENT REFERRED TO:  Walker Way MD  [address] [facility], [address] [place] [address]  [phone]    In 6 weeks        DISCHARGE MEDICATIONS:  Current Discharge Medication List      START taking these medications    Details   oxyCODONE-acetaminophen (PERCOCET) 5-325 MG per tablet Take 1 tablet by mouth every 4 hours as needed for Pain for up to 3 days. Qty: 30 tablet, Refills: 0    Comments: Reduce doses taken as pain becomes manageable  Associated Diagnoses: Flank pain      phenazopyridine (PYRIDIUM) 200 MG tablet Take 1 tablet by mouth 3 times daily as needed for Pain  Qty: 9 tablet, Refills: 0      oxybutynin (DITROPAN XL) 10 MG extended release tablet Take 1 tablet by mouth daily for 5 days  Qty: 5 tablet, Refills: 0             (Please note that portions of this note were completed with a voicerecognition program.  Efforts were made to edit the dictations but occasionally words are mis-transcribed.)    Thomas Palomares MD,, MD, F.A.C.E.P.   Attending Emergency Medicine Physician       Thomas Palomares MD  06/09/21 [id number]

## 2021-06-09 NOTE — PROGRESS NOTES
Patient is resting on cot. Patient respirations easy and unlabored. Breath sounds clear. Patient stated that his pain is very minimal.  Patient is updated on plan of care. Patient denies any needs at this time.

## 2021-06-11 NOTE — PROGRESS NOTES
CLINICAL PHARMACY NOTE: MEDS TO BEDS    Total # of Prescriptions Filled: 3   The following medications were delivered to the patient:  · Percocet 5-325  · Oxybut ER 10mg   · Pyridium 200mg    Additional Documentation:

## 2022-07-21 ENCOUNTER — HOSPITAL ENCOUNTER (OUTPATIENT)
Dept: MRI IMAGING | Age: 47
Discharge: HOME | End: 2022-07-21
Attending: OTOLARYNGOLOGY

## 2022-07-21 DIAGNOSIS — H90.5 SENSORINEURAL HEARING LOSS: ICD-10-CM

## 2022-07-21 PROCEDURE — 10002805 HB CONTRAST AGENT: Performed by: OTOLARYNGOLOGY

## 2022-07-21 PROCEDURE — 70553 MRI BRAIN STEM W/O & W/DYE: CPT

## 2022-07-21 PROCEDURE — A9577 INJ MULTIHANCE: HCPCS | Performed by: OTOLARYNGOLOGY

## 2022-07-21 RX ADMIN — GADOBENATE DIMEGLUMINE 20 ML: 529 INJECTION, SOLUTION INTRAVENOUS at 16:02

## 2022-10-16 ENCOUNTER — HOSPITAL ENCOUNTER (OUTPATIENT)
Dept: CT IMAGING | Age: 47
Discharge: HOME | End: 2022-10-16
Attending: NURSE PRACTITIONER

## 2022-10-16 DIAGNOSIS — Z82.49 FAMILY HISTORY OF CORONARY ARTERY DISEASE: ICD-10-CM

## 2022-10-16 PROCEDURE — 75571 CT HRT W/O DYE W/CA TEST: CPT

## 2022-10-21 ENCOUNTER — TELEPHONE (OUTPATIENT)
Dept: CARDIOLOGY | Age: 47
End: 2022-10-21

## 2025-04-15 ENCOUNTER — IMAGING SERVICES (OUTPATIENT)
Dept: GENERAL RADIOLOGY | Age: 50
End: 2025-04-15

## 2025-04-15 DIAGNOSIS — R05.9 COUGH, UNSPECIFIED: ICD-10-CM

## 2025-04-15 PROCEDURE — 71046 X-RAY EXAM CHEST 2 VIEWS: CPT | Performed by: ALLERGY & IMMUNOLOGY

## (undated) DEVICE — MHPB CYSTO PACK-LF: Brand: MEDLINE INDUSTRIES, INC.

## (undated) DEVICE — PROTECTOR ULN NRV PUR FOAM HK LOOP STRP ANATOMICALLY

## (undated) DEVICE — CATHETER URET 5FR L70CM TIP 8FR OPN END CONE TIP INJ HUB

## (undated) DEVICE — STRAP,POSITIONING,KNEE/BODY,FOAM,4X60": Brand: MEDLINE

## (undated) DEVICE — SOLUTION IV IRRIG WATER 1000ML POUR BRL 2F7114

## (undated) DEVICE — SINGLE ACTION PUMPING SYSTEM

## (undated) DEVICE — DRAPE,UNDRBUT,WHT GRAD PCH,CAPPORT,20/CS: Brand: MEDLINE

## (undated) DEVICE — 35 ML SYRINGE LUER-LOCK TIP: Brand: MONOJECT

## (undated) DEVICE — OCCLUSION BALLOON CATHETER: Brand: OCCLUDER

## (undated) DEVICE — Y-TYPE TUR/BLADDER IRRIGATION SET, REGULATING CLAMP

## (undated) DEVICE — ADAPTER URO SCP UROLOK LL

## (undated) DEVICE — PREP SOL PVP IODINE 4%  4 OZ/BTL

## (undated) DEVICE — SOLUTION SURG PREP POV IOD 7.5% 4 OZ

## (undated) DEVICE — Z DISCONTINUED BY MEDLINE USE 2711682 TRAY SKIN PREP DRY W/ PREM GLV

## (undated) DEVICE — CATHETER URETH 16FR BLLN 5CC SIL ALLY W/ SIL HYDRGEL 2 W F

## (undated) DEVICE — DRAPE,REIN 53X77,STERILE: Brand: MEDLINE

## (undated) DEVICE — DRAINBAG,ANTI-REFLUX TOWER,L/F,2000ML,LL: Brand: MEDLINE

## (undated) DEVICE — GLOVE ORANGE PI 7 1/2   MSG9075

## (undated) DEVICE — 4-PORT MANIFOLD: Brand: NEPTUNE 2

## (undated) DEVICE — GUIDEWIRE URO L150CM DIA0.035IN STIFF NIT HYDRPHLC STR TIP